# Patient Record
Sex: FEMALE | URBAN - METROPOLITAN AREA
[De-identification: names, ages, dates, MRNs, and addresses within clinical notes are randomized per-mention and may not be internally consistent; named-entity substitution may affect disease eponyms.]

---

## 2020-07-31 ENCOUNTER — OFFICE VISIT (OUTPATIENT)
Dept: URBAN - METROPOLITAN AREA CLINIC 58 | Facility: CLINIC | Age: 80
End: 2020-07-31
Payer: MEDICARE

## 2020-07-31 DIAGNOSIS — H25.11 AGE-RELATED NUCLEAR CATARACT, RIGHT EYE: Primary | ICD-10-CM

## 2020-07-31 DIAGNOSIS — H35.3131 NONEXUDATIVE AGE-RELATED MACULAR DEGENERATION, BILATERAL, EARLY DRY STAGE: ICD-10-CM

## 2020-07-31 DIAGNOSIS — H04.123 DRY EYE SYNDROME OF BILATERAL LACRIMAL GLANDS: ICD-10-CM

## 2020-07-31 PROCEDURE — 92014 COMPRE OPH EXAM EST PT 1/>: CPT | Performed by: OPTOMETRIST

## 2020-07-31 ASSESSMENT — KERATOMETRY
OS: 45.63
OD: 45.63

## 2020-07-31 ASSESSMENT — INTRAOCULAR PRESSURE
OD: 13
OS: 15

## 2020-07-31 ASSESSMENT — VISUAL ACUITY
OS: 20/25
OD: 20/40

## 2020-07-31 NOTE — IMPRESSION/PLAN
Impression: Nonexudative age-related macular degeneration, bilateral, early dry stage: H35.3131. Plan: Discussed diagnosis in detail with patient. No treatment is required at this time. Will continue to observe condition and or symptoms. Call if [address] worsens. AREDS 2 formula recommended.

## 2020-07-31 NOTE — IMPRESSION/PLAN
Impression: Age-related nuclear cataract, right eye: H25.11. Plan: Discussed diagnosis in detail. I recommended cataract surgery. Consult with Dr. Selina Stevens for pre-op for cataract surgery.

## 2020-08-10 ENCOUNTER — OFFICE VISIT (OUTPATIENT)
Dept: URBAN - METROPOLITAN AREA CLINIC 58 | Facility: CLINIC | Age: 80
End: 2020-08-10
Payer: MEDICARE

## 2020-08-10 PROCEDURE — 99204 OFFICE O/P NEW MOD 45 MIN: CPT | Performed by: OPHTHALMOLOGY

## 2020-08-10 RX ORDER — OFLOXACIN 3 MG/ML
0.3 % SOLUTION/ DROPS OPHTHALMIC
Qty: 5 | Refills: 1 | Status: ACTIVE
Start: 2020-08-10

## 2020-08-10 RX ORDER — PREDNISOLONE ACETATE 10 MG/ML
1 % SUSPENSION/ DROPS OPHTHALMIC
Qty: 5 | Refills: 1 | Status: INACTIVE
Start: 2020-08-10 | End: 2020-09-25

## 2020-08-10 ASSESSMENT — KERATOMETRY
OS: 45.88
OD: 45.50

## 2020-08-10 ASSESSMENT — VISUAL ACUITY
OS: 20/20
OD: 20/25

## 2020-08-10 ASSESSMENT — INTRAOCULAR PRESSURE
OS: 12
OD: 12

## 2020-08-10 NOTE — IMPRESSION/PLAN
Impression: Age-related nuclear cataract, right eye: H25.11. Plan: Cataracts account for the patient's complaints. Discussed all risks, benefits, procedures and recovery for cataract surgery in detail with the patient. Patient understands changing glasses will not improve vision. Patient desires to have surgery, recommend phacoemulsification with intraocular lens implant. Discussed lens implant options. Patient would like standard lens with a distance refractive goal.  Patient understands that glasses are needed after surgery.

## 2020-08-17 ENCOUNTER — TESTING ONLY (OUTPATIENT)
Dept: URBAN - METROPOLITAN AREA CLINIC 58 | Facility: CLINIC | Age: 80
End: 2020-08-17
Payer: MEDICARE

## 2020-08-17 PROCEDURE — 92136 OPHTHALMIC BIOMETRY: CPT | Performed by: OPHTHALMOLOGY

## 2020-08-17 ASSESSMENT — PACHYMETRY
OS: 5.52
OD: 23.29
OS: 23.64
OD: 3.20

## 2020-08-25 ENCOUNTER — SURGERY (OUTPATIENT)
Dept: URBAN - METROPOLITAN AREA SURGERY 32 | Facility: SURGERY | Age: 80
End: 2020-08-25
Payer: MEDICARE

## 2020-08-25 PROCEDURE — 66984 XCAPSL CTRC RMVL W/O ECP: CPT | Performed by: OPHTHALMOLOGY

## 2020-08-26 ENCOUNTER — POST-OPERATIVE VISIT (OUTPATIENT)
Dept: URBAN - METROPOLITAN AREA CLINIC 58 | Facility: CLINIC | Age: 80
End: 2020-08-26
Payer: MEDICARE

## 2020-08-26 PROCEDURE — 99024 POSTOP FOLLOW-UP VISIT: CPT | Performed by: OPTOMETRIST

## 2020-08-26 ASSESSMENT — INTRAOCULAR PRESSURE
OD: 18
OS: 17

## 2020-08-26 NOTE — IMPRESSION/PLAN
Impression: S/P Phaco - PC IOL OD - 1 Day. Encounter for surgical aftercare following surgery on a sense organ  Z48.810. Excellent post op course   Post operative instructions reviewed - Condition is improving - Plan: Prednisolone 1gtt QID x 2 wk then TID x 1 wk then BID x 1 wk then QD x 1 wk then d/c Ofloxacin 1gtt QID x 1 wk then d/c

## 2020-09-02 ENCOUNTER — POST-OPERATIVE VISIT (OUTPATIENT)
Dept: URBAN - METROPOLITAN AREA CLINIC 58 | Facility: CLINIC | Age: 80
End: 2020-09-02
Payer: MEDICARE

## 2020-09-02 PROCEDURE — 99024 POSTOP FOLLOW-UP VISIT: CPT | Performed by: OPTOMETRIST

## 2020-09-02 ASSESSMENT — INTRAOCULAR PRESSURE
OS: 16
OD: 14

## 2020-09-02 NOTE — IMPRESSION/PLAN
Impression: S/P Phaco - PC IOL OD - 8 Days. Encounter for surgical aftercare following surgery on a sense organ  Z48.810. Plan: 3 weeks po3 Prednisolone 1gtt QID x 1 wk then TID x 1 wk then BID x 1 wk then QD x 1 wk then d/c
D/C Ofloxacin 98

## 2020-09-25 ENCOUNTER — POST-OPERATIVE VISIT (OUTPATIENT)
Dept: URBAN - METROPOLITAN AREA CLINIC 58 | Facility: CLINIC | Age: 80
End: 2020-09-25
Payer: MEDICARE

## 2020-09-25 DIAGNOSIS — Z48.810 ENCOUNTER FOR SURGICAL AFTERCARE FOLLOWING SURGERY ON A SENSE ORGAN: Primary | ICD-10-CM

## 2020-09-25 PROCEDURE — 99024 POSTOP FOLLOW-UP VISIT: CPT | Performed by: OPTOMETRIST

## 2020-09-25 RX ORDER — PREDNISOLONE ACETATE 10 MG/ML
1 % SUSPENSION/ DROPS OPHTHALMIC
Qty: 5 | Refills: 0 | Status: ACTIVE
Start: 2020-09-25

## 2020-09-25 ASSESSMENT — VISUAL ACUITY
OD: 20/40
OS: 20/40

## 2020-09-25 ASSESSMENT — INTRAOCULAR PRESSURE
OD: 14
OS: 19

## 2020-09-25 NOTE — IMPRESSION/PLAN
Impression: S/P Phaco - PC IOL OD - 31 Days. Encounter for surgical aftercare following surgery on a sense organ  Z48.810. Excellent post op course. 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  OCT shows no puckering or CME Plan: Pt has right hyper appears 5 diopters, same on right and left gaze. Will probably have to do prism glasses. Continue pred 1gtt OD tid x 3 weeks.

## 2020-10-16 ENCOUNTER — POST-OPERATIVE VISIT (OUTPATIENT)
Dept: URBAN - METROPOLITAN AREA CLINIC 58 | Facility: CLINIC | Age: 80
End: 2020-10-16
Payer: MEDICARE

## 2020-10-16 PROCEDURE — 99024 POSTOP FOLLOW-UP VISIT: CPT | Performed by: OPTOMETRIST

## 2020-10-16 ASSESSMENT — INTRAOCULAR PRESSURE
OS: 20
OD: 15

## 2020-10-16 ASSESSMENT — VISUAL ACUITY
OD: 20/25
OS: 20/30

## 2020-10-16 NOTE — IMPRESSION/PLAN
Impression: S/P Phaco - PC IOL OD - 52 Days. Encounter for surgical aftercare following surgery on a sense organ  Z48.810. 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 Pt states it has been going on for about a year or so. Recommend pt to go back to [facility] for glasses with prism.  Plan: Back to [facility] for care pred 1gtt OD qd x 1 wk then d/c